# Patient Record
(demographics unavailable — no encounter records)

---

## 2024-11-02 NOTE — HISTORY OF PRESENT ILLNESS
[FreeTextEntry1] : Mr. Sotelo presents to the office today for a follow up visit.  Mr. Sotelo is a 51 year old gentleman with an extensive medical history.  He has a history of testicular cancer (s/p orchiectomy in 1995; no chemotherapy or radiation therapy), retinal detachment (s/p surgical repair in 2016), previous vitrectomy OS, and previous cataract surgery.  Mr. Sotelo was previously evaluated in Orthopedics for severe neck and back pain.  A subsequent MRI demonstrated numerous enhancing osseous lesions consistent with possible metastatic disease.  Based on the MRI findings, spinal surgery was planned.  During pre-operative evaluation, Mr. Sotelo was found to be in atrial fibrillation.  He was started on metoprolol but was not started on anticoagulation therapy due to imminent spinal surgery.  On June 9th, 2022 Mr. Sotelo underwent a C4 to T4 posterior spinal fusion, posterior spinal instrumentation of C4-T4, and decompression of neural elements at T1-T3 with excision of an extradural mass.  Pathologic analysis of the surgical specimens was consistent with a plasma cell neoplasm.  Mr. Sotelo has been undergoing chemotherapy for the multiple myeloma.  Mr. Sotelo will be undergoing an autologous peripheral stem cell transplant in November 2022.  Of note is that Mr. Sotelo has also been diagnosed with a pathologic fracture involving the right femur.  Mr. Sotelo was scheduled for an elective LADARIUS-guided electrical cardioversion out of atrial fibrillation.  He presented to [facility] on September 19th, 2022 in order to have the LADARIUS/DCCV performed.  In summary, the TTE/LADARIUS demonstrated normal LV and RV systolic function, with no regional wall motion abnormalities (LVEF 61%).  The mitral valve leaflets were found to be thickened, and the posterior mitral leaflet displayed reduced mobility.  Leaflet malcoaptation was noted in the region of A3/P3.  Severe mitral regurgitation was noted, with an eccentric, posteriorly directed jet.  Vena contracta width ~ 0.9 cm.  The estimated effective regurgitant orifice area (EROA) > 0.4 cm2 (by the PISA method).  The aortic valve was trileaflet with normal opening and no aortic regurgitation.  The left atrium was found to be severely dilated.  There was no left atrium or left atrial appendage thrombus.  There ws no evidence of a patent foramen ovale with the intravenous injection of agitated saline contrast.  Immediately following the LADARIUS, Mr. Sotelo underwent a successful DC cardioversion out of atrial fibrillation with a single biphasic 200J shock.  However, subsequent follow up electrocardiograms have demonstrated that he has reverted back to being in chronic atrial fibrillation.  Mr. Sotelo has been maintained on Xarelto for thromboembolic prophylaxis and has also been maintained on beta blocker therapy with metoprolol succinate 100 mg/day.  Mr. Sotelo underwent high-dose chemotherapy followed by autologous peripheral stem cell transplant on December 30th, 2022.  He is currently being maintained on prophylactic acyclovir, Diflucan, Mepron, and pantoprazole.   Mr. Sotelo underwent an exercise stress echocardiogram at [facility] on April 6th, 2023.  The stress echocardiogram was performed in order to re-evaluate the mitral regurgitation and also to objectively assess Mr. Sotelo's functional capacity in the setting of significant chronic mitral regurgitation.  In summary, baseline imaging demonstrated normal LV and RV systolic function, with no regional wall motion abnormalities (LVEF 64%).  The mitral valve leaflets were found to be mildly thickened.  There was severe mitral regurgitation with an eccentric, posteriorly directed jet.  The left atrium was found to be moderately dilated.  The pulmonary artery systolic pressure could not be accurately estimated.  During the stress test, Mr. Sotelo exercised for 5:00 of a standard [facility] protocol and achieved a peak heart rate of 200/minute (117% of the maximum predicted heart rate).  Mr. Sotelo experienced dyspnea during the test but there were no significant ST/T abnormalities noted on the ECG.  Repeat echocardiographic images obtained immediately following peak exercise demonstrated normal augmentation in left ventricular systolic function, with no evidence of inducible ischemia.  The pulmonary artery systolic pressure could not be accurately estimated following peak exercise.  Due to the diminished exercise capacity as demonstrated on the exercise stress echocardiogram, Mr. Sotelo was referred to the Structural Heart Disease program at [facility] for evaluation of his possible candidacy for percutaneous transcatheter mitral valve repair.  At the time of that evaluation, it was felt that the severity of the mitral regurgitation may be related to the fact that Mr. Sotelo remained in atrial fibrillation.  The recommendation was for Mr. Sotelo to undergo catheter ablation of atrial fibrillation and, if this successfully restored and maintained sinus rhythm, to re-evaluate the degree of mitral regurgitation while he is in sinus rhythm.  Mr. Sotelo underwent catheter ablation of atrial fibrillation at [facility] on August 24th, 2023.  The ablation procedure was successful and all four pulmonary veins were isolated.  Of note is that Mr. Sotelo had a recurrence of atrial fibrillation in December 2023 and was scheduled to undergo elective outpatient DC-cardioversion.  However, he converted spontaneously back to sinus rhythm and therefore the cardioversion was cancelled.  Of note is that Mr. Sotelo has since been started and maintained on amiodarone therapy.  Mr. Sotelo's most recent transthoracic echocardiogram was performed at [facility] on September 13th, 2024.  In summary, this demonstrated normal LV and RV systolic function, with no regional wall motion abnormalities (LVEF 63%; LV end-diastolic dimension 5.2 cm).  The posterior mitral valve leaflet is tethered with restricted motion.  Severe mitral regurgitation noted (estimated regurgitant volume ~ 78 mL).  There is mild left atrial dilatation.  Mr. Sotelo now presents for follow up.  He reports that he has been feeling well.  He reports walking for extended distances at least 3-4 times per week and has not experienced any difficulty with this.  In particular, there has been no dyspnea either at rest or with exertion.  In addition, Mr. Sotelo denies having any chest pain either at rest or with exertion.  There have been no palpitations, presyncope, or syncope.  Mr. Sotelo denies having any orthopnea, paroxysmal nocturnal dyspnea, or edema.  Note that Mr. Sotelo is currently taking amiodarone 100 mg every other day.
28-Jul-2021 10:21

## 2024-11-02 NOTE — DISCUSSION/SUMMARY
[EKG obtained to assist in diagnosis and management of assessed problem(s)] : EKG obtained to assist in diagnosis and management of assessed problem(s) [FreeTextEntry1] : In summary, Mr. Sotelo appears to be doing well from a cardiac standpoint.  He continues to maintain a good functional capacity and has not had any recent symptoms suggestive of angina or decompensated heart failure.  Mr. Sotelo does not appear to experience exertional dyspnea.  Today's 12-lead ECG demonstrates that he remains in sinus rhythm.  Mr. Sotelo's most recent transthoracic echocardiogram (9/13/2024) demonstrates the persistent presence of severe mitral regurgitation, likely due to a tethered/restricted posterior mitral valve leaflet.  Note that the follow up echocardiogram was performed in the setting of restored sinus rhythm.  Although Mr. Sotelo appears to remain minimally symptomatic, the persistence of severe mitral regurgitation likely warrants repeat evaluation in the Structural Heart Disease clinic regarding the possibility/feasibility of transcatheter mitral valve repair.  In the meantime, Mr. Sotelo will continue to be monitored clinically.  His blood pressure is under very good control on his current medical regimen.  Mr. Haney will return to the office for follow up in approximately two months, or sooner as necessary.

## 2024-11-02 NOTE — PHYSICAL EXAM
[Normal Conjunctiva] : normal conjunctiva [No Xanthelasma] : no xanthelasma [Normal Venous Pressure] : normal venous pressure [Normal S1, S2] : normal S1, S2 [No Rub] : no rub [Murmur] : murmur [No Gallop] : no gallop [Soft] : abdomen soft [Non Tender] : non-tender [Normal Bowel Sounds] : normal bowel sounds [Normal] : normal gait [No Edema] : no edema [No Cyanosis] : no cyanosis [No Clubbing] : no clubbing [No Rash] : no rash [Moves all extremities] : moves all extremities [Alert and Oriented] : alert and oriented [de-identified] : grade I/VI systolic murmur

## 2025-01-12 NOTE — DISCUSSION/SUMMARY
[EKG obtained to assist in diagnosis and management of assessed problem(s)] : EKG obtained to assist in diagnosis and management of assessed problem(s) [FreeTextEntry1] : In summary, Mr. Sotelo appears to be doing reasonably well from a cardiac standpoint.  He continues to maintain a good functional capacity and has not had any recent symptoms suggestive of angina or congestive heart failure.  In addition, Mr. Sotelo's blood pressure is under very good control on his current medical regimen.  Today's 12-lead electrocardiogram demonstrates that Mr. Sotelo has returned to atrial fibrillation.  However, he appears to be minimally symptomatic from the arrhythmia.  He will continue with metoprolol succinate for ventricular rate control.  Mr. Sotelo will also continue with Xarelto for thromboembolic prophylaxis in the setting of paroxysmal atrial fibrillation.  He will continue with amiodarone pending further evaluation in Electrophysiology.  Consideration can be given to proceeding with a DC cardioversion out of atrial fibrillation and/or a repeat catheter ablation.  Mr. Sotelo will return to the office for follow up in several months, at which time we will obtain a repeat transthoracic echocardiogram in order to monitor the degree of mitral regurgitation, LV and RV systolic function, LV size, and any evidence of pulmonary hypertension.  In the meantime, Mr. Sotelo was urged to contact the office should he experience any symptoms of dyspnea or exercise intolerance.  He will also follow up in Electrophysiology regarding further options for treatment of recurrent atrial fibrillation.

## 2025-01-12 NOTE — HISTORY OF PRESENT ILLNESS
[FreeTextEntry1] : Mr. Sotelo presents to the office today for a follow up visit.  Mr. Sotelo is a 51 year old gentleman with an extensive medical history.  He has a history of multiple myeloma (s/p high dose chemotherapy followed by autologous peripheral stem cell transplant (December 2022), testicular cancer (s/p orchiectomy in 1995; no chemotherapy or radiation therapy), retinal detachment (s/p surgical repair in 2016), previous vitrectomy OS, and previous cataract surgery.  Mr. Sotelo was previously evaluated in Orthopedics for severe neck and back pain.  A subsequent MRI demonstrated numerous enhancing osseous lesions consistent with possible metastatic disease.  Based on the MRI findings, spinal surgery was planned.  During pre-operative evaluation, Mr. Sotelo was found to be in atrial fibrillation.  He was started on metoprolol but was not started on anticoagulation therapy due to imminent spinal surgery.  On June 9th, 2022 Mr. Sotelo underwent a C4 to T4 posterior spinal fusion, posterior spinal instrumentation of C4-T4, and decompression of neural elements at T1-T3 with excision of an extradural mass.  Pathologic analysis of the surgical specimens was consistent with a plasma cell neoplasm.  Mr. Sotelo has been undergoing chemotherapy for the multiple myeloma.  Mr. Sotelo will be undergoing an autologous peripheral stem cell transplant in November 2022.  Of note is that Mr. Sotelo has also been diagnosed with a pathologic fracture involving the right femur.  Mr. Sotelo was scheduled for an elective LADARIUS-guided electrical cardioversion out of atrial fibrillation.  He presented to [facility] on September 19th, 2022 in order to have the LADARIUS/DCCV performed.  In summary, the TTE/LADARIUS demonstrated normal LV and RV systolic function, with no regional wall motion abnormalities (LVEF 61%).  The mitral valve leaflets were found to be thickened, and the posterior mitral leaflet displayed reduced mobility.  Leaflet malcoaptation was noted in the region of A3/P3.  Severe mitral regurgitation was noted, with an eccentric, posteriorly directed jet.  Vena contracta width ~ 0.9 cm.  The estimated effective regurgitant orifice area (EROA) > 0.4 cm2 (by the PISA method).  The aortic valve was trileaflet with normal opening and no aortic regurgitation.  The left atrium was found to be severely dilated.  There was no left atrium or left atrial appendage thrombus.  There ws no evidence of a patent foramen ovale with the intravenous injection of agitated saline contrast.  Immediately following the LADARIUS, Mr. Sotelo underwent a successful DC cardioversion out of atrial fibrillation with a single biphasic 200J shock.  However, subsequent follow up electrocardiograms have demonstrated that he has reverted back to being in chronic atrial fibrillation.  Mr. Sotelo has been maintained on Xarelto for thromboembolic prophylaxis and has also been maintained on beta blocker therapy with metoprolol succinate 100 mg/day.  Mr. Sotelo underwent high-dose chemotherapy followed by autologous peripheral stem cell transplant on December 30th, 2022.  He is currently being maintained on prophylactic acyclovir, Diflucan, Mepron, and pantoprazole.   Mr. Sotelo underwent an exercise stress echocardiogram at [facility] on April 6th, 2023.  The stress echocardiogram was performed in order to re-evaluate the mitral regurgitation and also to objectively assess Mr. Sotelo's functional capacity in the setting of significant chronic mitral regurgitation.  In summary, baseline imaging demonstrated normal LV and RV systolic function, with no regional wall motion abnormalities (LVEF 64%).  The mitral valve leaflets were found to be mildly thickened.  There was severe mitral regurgitation with an eccentric, posteriorly directed jet.  The left atrium was found to be moderately dilated.  The pulmonary artery systolic pressure could not be accurately estimated.  During the stress test, Mr. Sotelo exercised for 5:00 of a standard [facility] protocol and achieved a peak heart rate of 200/minute (117% of the maximum predicted heart rate).  Mr. Sotelo experienced dyspnea during the test but there were no significant ST/T abnormalities noted on the ECG.  Repeat echocardiographic images obtained immediately following peak exercise demonstrated normal augmentation in left ventricular systolic function, with no evidence of inducible ischemia.  The pulmonary artery systolic pressure could not be accurately estimated following peak exercise.  Due to the diminished exercise capacity as demonstrated on the exercise stress echocardiogram, Mr. Sotelo was referred to the Structural Heart Disease program at [facility] for evaluation of his possible candidacy for percutaneous transcatheter mitral valve repair.  At the time of that evaluation, it was felt that the severity of the mitral regurgitation may be related to the fact that Mr. Sotelo remained in atrial fibrillation.  The recommendation was for Mr. Sotelo to undergo catheter ablation of atrial fibrillation and, if this successfully restored and maintained sinus rhythm, to re-evaluate the degree of mitral regurgitation while he is in sinus rhythm.  Mr. Sotelo underwent catheter ablation of atrial fibrillation at [facility] on August 24th, 2023.  The ablation procedure was successful and all four pulmonary veins were isolated.  Of note is that Mr. Sotelo had a recurrence of atrial fibrillation in December 2023 and was scheduled to undergo elective outpatient DC-cardioversion.  However, he converted spontaneously back to sinus rhythm and therefore the cardioversion was cancelled.  Of note is that Mr. Sotelo has since been started and maintained on amiodarone therapy.  Mr. Sotelo's most recent transthoracic echocardiogram was performed at [facility] on September 13th, 2024.  In summary, this demonstrated normal LV and RV systolic function, with no regional wall motion abnormalities (LVEF 63%; LV end-diastolic dimension 5.2 cm).  The posterior mitral valve leaflet is tethered with restricted motion.  Severe mitral regurgitation noted (estimated regurgitant volume ~ 78 mL).  There is mild left atrial dilatation.  Mr. Sotelo reports that he has been feeling well since his last office visit.  He has been able to walk without difficulty or limitation and denies having any chest pain or dyspnea either at rest or with exertion.  In addition, there have been no palpitations, presyncope, or syncope.  Mr. Sotelo denies having any orthopnea, paroxysmal nocturnal dyspnea, or edema.  Mr. Sotelo is currently taking amiodarone 100 mg every other day.

## 2025-01-12 NOTE — CARDIOLOGY SUMMARY
[de-identified] : 1/10/2025:  Atrial fibrillation with an average ventricular response of 92/minute; nonspecific ST/T abnormalities.

## 2025-01-12 NOTE — PHYSICAL EXAM
[Normal Conjunctiva] : normal conjunctiva [No Xanthelasma] : no xanthelasma [Normal Venous Pressure] : normal venous pressure [Normal S1, S2] : normal S1, S2 [No Rub] : no rub [No Gallop] : no gallop [Murmur] : murmur [Soft] : abdomen soft [Non Tender] : non-tender [Normal Bowel Sounds] : normal bowel sounds [Normal] : normal gait [No Edema] : no edema [No Cyanosis] : no cyanosis [No Clubbing] : no clubbing [No Rash] : no rash [Moves all extremities] : moves all extremities [Alert and Oriented] : alert and oriented [de-identified] : grade II/VI holosystolic murmur at apex.

## 2025-03-26 NOTE — CARDIOLOGY SUMMARY
[de-identified] : None today; prior on 1/10/25 AFIB 90's [de-identified] : September 13th, 2024. In summary, this demonstrated normal LV and RV systolic function, with no regional wall motion abnormalities (LVEF 63%; LV end-diastolic dimension 5.2 cm). The posterior mitral valve leaflet is tethered with restricted motion. Severe mitral regurgitation noted (estimated regurgitant volume ~ 78 mL). There is mild left atrial dilatation.

## 2025-03-26 NOTE — REVIEW OF SYSTEMS
[Joint Pain] : joint pain [Negative] : Psychiatric [Headache] : no headache [Feeling Fatigued] : not feeling fatigued [SOB] : no shortness of breath [Dyspnea on exertion] : not dyspnea during exertion [Chest Discomfort] : no chest discomfort [Lower Ext Edema] : no extremity edema [de-identified] : Multiple myeloma

## 2025-03-26 NOTE — CARDIOLOGY SUMMARY
[de-identified] : None today; prior on 1/10/25 AFIB 90's [de-identified] : September 13th, 2024. In summary, this demonstrated normal LV and RV systolic function, with no regional wall motion abnormalities (LVEF 63%; LV end-diastolic dimension 5.2 cm). The posterior mitral valve leaflet is tethered with restricted motion. Severe mitral regurgitation noted (estimated regurgitant volume ~ 78 mL). There is mild left atrial dilatation.

## 2025-03-26 NOTE — CONSULT LETTER
[Dear  ___] : Dear  [unfilled], [Courtesy Letter:] : I had the pleasure of seeing your patient, [unfilled], in my office today. [Please see my note below.] : Please see my note below. [Consult Closing:] : Thank you very much for allowing me to participate in the care of this patient.  If you have any questions, please do not hesitate to contact me. [Sincerely,] : Sincerely, [FreeTextEntry2] : Dr. Epifanio Pastor [FreeTextEntry1] :   Please find attached our consultation on your patient, Mr. HUGHES We take a multidisciplinary team approach to patient care and consider you, the referring physician, an extension of our team. We will maintain an open line of communication with you throughout your patient's treatment course. It is our commitment to provide your patient with the highest quality of advanced therapeutic options. We thank you for allowing us to participate in the care of your patient. Please do not hesitate to contact our team with any questions or concerns at [phone].    [FreeTextEntry3] : Stanley Huang MD  Department of cardiovascular and Thoracic surgery Professor Department of surgery [facility]

## 2025-03-26 NOTE — DISCUSSION/SUMMARY
[FreeTextEntry1] : Mr. HUGHES has severe MR, chronic AF (prior RFA), and remains active without significant symptoms. I calculated his STS for MVr + AFIB surgery to be 0.35% (for mortality). Given his low surgical risk, he is not suitable for consideration for M-ELLY. He is also too young to be considered for either of our randomized trials (PRIMARY and REPAIR-MR, both randomized between surgical MV repair and M-ELLY, for low and intermediate risk surgical patients, respectively). I am therefore recommending evaluation with one of my CT Surgery colleagues to be considered for surgical MV repair, AF ablation, and LAAO. All questions were answered in detail and Mr. Hughes is amenable to considering surgery (if and when deemed appropriate), as outlined above. He will ultimately require a R+L cardiac catheterization and, at the discretion of the surgeon, possibly a LADARIUS for a more detailed evaluation of the MV anatomy.

## 2025-03-26 NOTE — REVIEW OF SYSTEMS
[Joint Pain] : joint pain [Negative] : Psychiatric [Headache] : no headache [Feeling Fatigued] : not feeling fatigued [SOB] : no shortness of breath [Dyspnea on exertion] : not dyspnea during exertion [Chest Discomfort] : no chest discomfort [Lower Ext Edema] : no extremity edema [de-identified] : Multiple myeloma

## 2025-03-26 NOTE — REVIEW OF SYSTEMS
[Joint Pain] : joint pain [Negative] : Psychiatric [Headache] : no headache [Feeling Fatigued] : not feeling fatigued [SOB] : no shortness of breath [Dyspnea on exertion] : not dyspnea during exertion [Chest Discomfort] : no chest discomfort [Lower Ext Edema] : no extremity edema [de-identified] : Multiple myeloma

## 2025-03-26 NOTE — REVIEW OF SYSTEMS
[Joint Pain] : joint pain [Negative] : Psychiatric [Headache] : no headache [Feeling Fatigued] : not feeling fatigued [SOB] : no shortness of breath [Dyspnea on exertion] : not dyspnea during exertion [Chest Discomfort] : no chest discomfort [Lower Ext Edema] : no extremity edema [de-identified] : Multiple myeloma

## 2025-03-26 NOTE — PHYSICAL EXAM
[Well Developed] : well developed [No Acute Distress] : no acute distress [Normal S1, S2] : normal S1, S2 [Murmur] : murmur [No Edema] : no edema [Normal] : alert and oriented, normal memory [de-identified] : II/VI LG

## 2025-03-26 NOTE — CARDIOLOGY SUMMARY
[de-identified] : None today; prior on 1/10/25 AFIB 90's [de-identified] : September 13th, 2024. In summary, this demonstrated normal LV and RV systolic function, with no regional wall motion abnormalities (LVEF 63%; LV end-diastolic dimension 5.2 cm). The posterior mitral valve leaflet is tethered with restricted motion. Severe mitral regurgitation noted (estimated regurgitant volume ~ 78 mL). There is mild left atrial dilatation.

## 2025-03-26 NOTE — CARDIOLOGY SUMMARY
[de-identified] : None today; prior on 1/10/25 AFIB 90's [de-identified] : September 13th, 2024. In summary, this demonstrated normal LV and RV systolic function, with no regional wall motion abnormalities (LVEF 63%; LV end-diastolic dimension 5.2 cm). The posterior mitral valve leaflet is tethered with restricted motion. Severe mitral regurgitation noted (estimated regurgitant volume ~ 78 mL). There is mild left atrial dilatation.

## 2025-03-26 NOTE — HISTORY OF PRESENT ILLNESS
[FreeTextEntry1] :   Mr. HUGHES is a 51 year old male with  past medical history of multiple myeloma (s/p high dose chemotherapy followed by autologous peripheral stem cell transplant (December 2022), testicular cancer (s/p orchiectomy in 1995; no chemotherapy or radiation therapy), retinal detachment (s/p surgical repair in 2016), previous vitrectomy OS, and previous cataract surgery,  Afib and underwent successful DC cardioversion in 2022 , s/p afib ablation in August 2023 maintained on amiodarone and Xarelto and Mitral regurgitation . He walks outdoors with his dog for exercise 5 days a week without issue. He was seen by Dr. Forest Prince from structural heart on 3/24/25. He is referred by Dr. Forest Prince for initial evaluation and management for mitral regurgitation.      Acute , chronic or acute on chronic NYHA class Therapy used to manage Chf Diuretic ACE  ARB

## 2025-03-26 NOTE — PHYSICAL EXAM
[Well Developed] : well developed [No Acute Distress] : no acute distress [Normal S1, S2] : normal S1, S2 [Murmur] : murmur [No Edema] : no edema [Normal] : alert and oriented, normal memory [de-identified] : II/VI LG

## 2025-03-26 NOTE — PHYSICAL EXAM
[Well Developed] : well developed [No Acute Distress] : no acute distress [Normal S1, S2] : normal S1, S2 [Murmur] : murmur [No Edema] : no edema [Normal] : alert and oriented, normal memory [de-identified] : II/VI LG

## 2025-03-26 NOTE — PHYSICAL EXAM
[Well Developed] : well developed [No Acute Distress] : no acute distress [Normal S1, S2] : normal S1, S2 [Murmur] : murmur [No Edema] : no edema [Normal] : alert and oriented, normal memory [de-identified] : II/VI LG

## 2025-03-26 NOTE — HISTORY OF PRESENT ILLNESS
[FreeTextEntry1] : Mr. Sotelo is a 51-year-old male referred by Dr. Pastor for evaluation of mitral regurgitation. 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 He has atrial fibrillation, for which he was cardioverted in 2022 and then ablated in August 2023 following a recurrence; he appears to be back in AFIB as of January 2025, for which he is maintained on Amiodarone and Xarelto.   Today he comes in stating he feels well. He denies having any SOB, CP, edema, or dizziness with activity. He remains active by walking, and finds he has no limitations.  Repeat echocardiogram today was reviewed in detail with Dr Nathalie Nazario with the following impressions: Left ventricular cavity is normal in size. Left ventricular systolic function is normal with an ejection fraction of 53 % by Clarke's method of disks. There are no regional wall motion abnormalities seen. Normal right ventricular cavity size and normal right ventricular systolic function. Left atrium is severely dilated. The right atrium is mildly dilated. There is mild thickening at the tips of the mitral valve leaflets.  The posterior leaflet is severely restricted. The transmitral peak gradient is 17.6 mmHg and mean gradient is 6.00 mmHg at a heart rate of 88 bpm. There is severe mitral regurgitation. By quantitative Doppler, the EROA= 0.64cm2 with a regurgitant volume= 99ml.Normal tricuspid valve with mild tricuspid regurgitation. No echocardiographic evidence of pulmonary hypertension. The patient was in atrial fibrillation (rate controlled) at the time of the study. No prior echocardiogram is available for comparison.  He walks outdoors with his dog for exercise 5 days a week without issue. He has no ANTONIO, angina, dizziness, palpitations, or syncope, as noted above. His appetite and bathroom habits are normal. He looks well and is in good spirits today in the office.  I calculated his STS risk for MVr + A-Fib surgery as follows: Mortality 0.35%, Morbidity & Mortality 5.8% (I included Immunosuppression in his Risk Factors)

## 2025-03-26 NOTE — DATA REVIEWED
[FreeTextEntry1] :  3/24/25 TTE revealed 1. Left ventricular cavity is normal in size. Left ventricular systolic function is normal with an ejection fraction of 53 % by Clarke's method of disks. There are no regional wall motion abnormalities seen.  2. Normal right ventricular cavity size and normal right ventricular systolic function.  3. Left atrium is severely dilated.  4. The right atrium is mildly dilated.  5. There is mild thickening at the tips of the mitral valve leaflets. The posterior leaflet is severely restricted. There is no mitral valve stenosis. The transmitral peak gradient is 17.6 mmHg and mean gradient is 6.00 mmHg at a heart rate of 88 bpm. There is no mitral stenosis by 3D planimery. MVOA \R\3.5cm2. There is severe mitral regurgitation. By quantitative Doppler, the EROA= 0.64cm2 with a regurgitant volume= 99ml.  6. Normal tricuspid valve with mild tricuspid regurgitation.  7. No echocardiographic evidence of pulmonary hypertension.  8. The patient was in atrial fibrillation (rate controlled) at the time of the study.  9. No prior echocardiogram is available for comparison.

## 2025-04-07 NOTE — CONSULT LETTER
[FreeTextEntry2] : Dr. Epifanio Pastor [FreeTextEntry1] :   Please find attached our consultation on your patient, Mr. HUGHES We take a multidisciplinary team approach to patient care and consider you, the referring physician, an extension of our team. We will maintain an open line of communication with you throughout your patient's treatment course. It is our commitment to provide your patient with the highest quality of advanced therapeutic options. We thank you for allowing us to participate in the care of your patient. Please do not hesitate to contact our team with any questions or concerns at [phone].    [FreeTextEntry3] : Stanley Huang MD  Department of cardiovascular and Thoracic surgery Professor Department of surgery [facility]

## 2025-04-07 NOTE — ASSESSMENT
[FreeTextEntry1] : I reviewed the cardiac imaging, medical records and reports with patient and discussed the case.  I discussed the risks, benefits and alternatives to surgery. Risks included but not limited to  bleeding, stroke, Myocardial Infarction, kidney problems, Blood transfusion, permanent pacemaker implantation, infections and death. I also discussed the various approaches in detail.    I had a detailed discussion with pt and his wife regarding his severely dilated LA and hx of afib as it relates to his severe MR. Given this, I feel that the patient will benefit and is a candidate for MVR/MAZE/LAAL. All questions and concerns were addressed.  I have recommended pt undergo LADARIUS to better assess MV disease and if it is possible for repair vs replacement.      Plan:  - LADARIUS

## 2025-04-07 NOTE — PHYSICAL EXAM
[General Appearance - Alert] : alert [General Appearance - In No Acute Distress] : in no acute distress [Sclera] : the sclera and conjunctiva were normal [Neck Appearance] : the appearance of the neck was normal [Jugular Venous Distention Increased] : there was no jugular-venous distention [] : no respiratory distress [Respiration, Rhythm And Depth] : normal respiratory rhythm and effort [Exaggerated Use Of Accessory Muscles For Inspiration] : no accessory muscle use [Auscultation Breath Sounds / Voice Sounds] : lungs were clear to auscultation bilaterally [Apical Impulse] : the apical impulse was normal [Heart Rate And Rhythm] : heart rate was normal and rhythm regular [Heart Sounds] : normal S1 and S2 [FreeTextEntry1] : 2/6 systolic murmur at axilla [Abdomen Soft] : soft [Abdomen Tenderness] : non-tender [Involuntary Movements] : no involuntary movements were seen [No Focal Deficits] : no focal deficits [Oriented To Time, Place, And Person] : oriented to person, place, and time [Impaired Insight] : insight and judgment were intact [Affect] : the affect was normal [Mood] : the mood was normal

## 2025-04-07 NOTE — HISTORY OF PRESENT ILLNESS
[FreeTextEntry1] : Mr. HUGHES is a 51 year old male with  past medical history of multiple myeloma (s/p high dose chemotherapy followed by autologous peripheral stem cell transplant (December 2022), testicular cancer (s/p orchiectomy in 1995; no chemotherapy or radiation therapy), retinal detachment (s/p surgical repair in 2016), previous vitrectomy OS, and previous cataract surgery,  Afib and underwent successful DC cardioversion in 2022 , s/p afib ablation in August 2023 maintained on amiodarone and Xarelto and Mitral regurgitation . He walks outdoors with his dog for exercise 5 days a week without issue. He was seen by Dr. Forest Prince from structural heart on 3/24/25. He is referred by Dr. Forest Prince for initial evaluation and management for mitral regurgitation. Cervical fusion and thoracic in June 2022.   He was seen by Dr. Forest Prince 3/24.  At that time per review of his note, he remained active without significant symptoms. His STS score for MVR + AFIB surgery to be 0.35% (for mortality). Given his low surgical risk, he was deemed not suitable for consideration for M-ELLY. He is also too young to be considered for either randomized trials (PRIMARY and REPAIR-MR.   He presents today and reports doing fine from a cardiac perspective.  He reports he was dx with afib some years ago during routine exam.  Denies feeling any palpitations, dizziness, SOB, CP, swelling, syncope, fever or chills.  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  Presents today with his wife Zulema.

## 2025-04-07 NOTE — END OF VISIT
[FreeTextEntry3] : I, Dr. Huang, personally performed the evaluation and management for this post op patient who presents today. Evaluation includes conducting the examination, assessing all conditions and establishing a plan of care moving forward. Today, the ACP, Nathan Valente, was here to observe my evaluation and management services for this patient.